# Patient Record
Sex: MALE | Race: WHITE | NOT HISPANIC OR LATINO | Employment: UNEMPLOYED | URBAN - METROPOLITAN AREA
[De-identification: names, ages, dates, MRNs, and addresses within clinical notes are randomized per-mention and may not be internally consistent; named-entity substitution may affect disease eponyms.]

---

## 2023-01-02 ENCOUNTER — APPOINTMENT (OUTPATIENT)
Dept: RADIOLOGY | Facility: MEDICAL CENTER | Age: 33
End: 2023-01-02
Attending: EMERGENCY MEDICINE

## 2023-01-02 ENCOUNTER — HOSPITAL ENCOUNTER (EMERGENCY)
Facility: MEDICAL CENTER | Age: 33
End: 2023-01-02
Attending: EMERGENCY MEDICINE

## 2023-01-02 VITALS
SYSTOLIC BLOOD PRESSURE: 136 MMHG | RESPIRATION RATE: 16 BRPM | BODY MASS INDEX: 41.75 KG/M2 | HEART RATE: 67 BPM | HEIGHT: 73 IN | OXYGEN SATURATION: 95 % | TEMPERATURE: 97.2 F | WEIGHT: 315 LBS | DIASTOLIC BLOOD PRESSURE: 65 MMHG

## 2023-01-02 DIAGNOSIS — S43.014A ANTERIOR DISLOCATION OF RIGHT SHOULDER, INITIAL ENCOUNTER: ICD-10-CM

## 2023-01-02 PROCEDURE — 73030 X-RAY EXAM OF SHOULDER: CPT | Mod: RT

## 2023-01-02 PROCEDURE — A9270 NON-COVERED ITEM OR SERVICE: HCPCS | Performed by: EMERGENCY MEDICINE

## 2023-01-02 PROCEDURE — 700111 HCHG RX REV CODE 636 W/ 250 OVERRIDE (IP): Performed by: EMERGENCY MEDICINE

## 2023-01-02 PROCEDURE — 23650 CLTX SHO DSLC W/MNPJ WO ANES: CPT

## 2023-01-02 PROCEDURE — 96374 THER/PROPH/DIAG INJ IV PUSH: CPT

## 2023-01-02 PROCEDURE — 700102 HCHG RX REV CODE 250 W/ 637 OVERRIDE(OP): Performed by: EMERGENCY MEDICINE

## 2023-01-02 PROCEDURE — 99285 EMERGENCY DEPT VISIT HI MDM: CPT

## 2023-01-02 RX ORDER — OXYCODONE HYDROCHLORIDE AND ACETAMINOPHEN 5; 325 MG/1; MG/1
1 TABLET ORAL ONCE
Status: COMPLETED | OUTPATIENT
Start: 2023-01-02 | End: 2023-01-02

## 2023-01-02 RX ORDER — PROPOFOL 10 MG/ML
200 INJECTION, EMULSION INTRAVENOUS ONCE
Status: DISCONTINUED | OUTPATIENT
Start: 2023-01-02 | End: 2023-01-02 | Stop reason: HOSPADM

## 2023-01-02 RX ORDER — PROPOFOL 10 MG/ML
INJECTION, EMULSION INTRAVENOUS
Status: COMPLETED | OUTPATIENT
Start: 2023-01-02 | End: 2023-01-02

## 2023-01-02 RX ADMIN — PROPOFOL 30 MG: 10 INJECTION, EMULSION INTRAVENOUS at 12:38

## 2023-01-02 RX ADMIN — OXYCODONE AND ACETAMINOPHEN 1 TABLET: 5; 325 TABLET ORAL at 11:02

## 2023-01-02 RX ADMIN — PROPOFOL 20 MG: 10 INJECTION, EMULSION INTRAVENOUS at 12:42

## 2023-01-02 RX ADMIN — PROPOFOL 30 MG: 10 INJECTION, EMULSION INTRAVENOUS at 12:40

## 2023-01-02 RX ADMIN — PROPOFOL 30 MG: 10 INJECTION, EMULSION INTRAVENOUS at 12:46

## 2023-01-02 RX ADMIN — PROPOFOL 20 MG: 10 INJECTION, EMULSION INTRAVENOUS at 12:44

## 2023-01-02 RX ADMIN — PROPOFOL 20 MG: 10 INJECTION, EMULSION INTRAVENOUS at 12:43

## 2023-01-02 RX ADMIN — PROPOFOL 20 MG: 10 INJECTION, EMULSION INTRAVENOUS at 12:45

## 2023-01-02 NOTE — ED PROVIDER NOTES
ED Physician Note        CHIEF COMPLAINT  Chief Complaint   Patient presents with    Shoulder Pain    GLF     Pt slipped on ice this AM and tried catching himself with his R arm, pt now believes his R shoulder is dislocated, no hx of dislocation, shoulder does appear to possibly be dislocated in triage, arm is in sling, +CMS in distal RUE           HPI    Adam Power is a 32 y.o. male who presents to the emergency department today for evaluation of a right shoulder injury that occurred immediately prior to arrival.  He was walking when he slipped on ice and fell striking his right shoulder.  He now has pain localized to the right shoulder, he states he is having difficulty ranging the shoulder.  He does not have any significant pain localized to the elbow or wrist.  He also struck his left upper extremity but states he did not have any significant pain, the left upper extremity feels fine.  He did not strike his head, he reports no headache, no nausea, no vomiting.  He reports no other injuries, he was easily able to ambulate in, reports no injuries to the lower extremities.  He reports no significant past medical history, he is not currently on any anticoagulation or antiplatelet agents.    REVIEW OF SYSTEMS  See HPI for further details.     PAST MEDICAL HISTORY       SURGICAL HISTORY  patient denies any surgical history    FAMILY HISTORY  No family history on file.    SOCIAL HISTORY  Social History     Tobacco Use    Smoking status: Not on file    Smokeless tobacco: Not on file   Substance and Sexual Activity    Alcohol use: Not on file    Drug use: Not on file    Sexual activity: Not on file       CURRENT MEDICATIONS  Home Medications       Reviewed by Anant Mcmanus R.N. (Registered Nurse) on 01/02/23 at 1028  Med List Status: Not Addressed     Medication Last Dose Status        Patient José Luis Taking any Medications                           ALLERGIES  Not on File    PHYSICAL EXAM  Vital Signs: BP  "136/65   Pulse 67   Temp 36.2 °C (97.2 °F)   Resp 16   Ht 1.854 m (6' 1\")   Wt (!) 145 kg (320 lb 5.3 oz)   SpO2 95%   BMI 42.26 kg/m²   Constitutional: Alert, no acute distress  HENT: Atraumatic  Neck: Normal range of motion  Cardiovascular: Right upper extremity warm, well perfused, 2+ radial pulse  Pulmonary: Normal work of breathing  Skin: Right shoulder with normal appearing overlying skin, no lacerations nor abrasions  Musculoskeletal: Right shoulder with some tenderness to palpation along the anterior aspect, he is unable to fully range the shoulder on my initial assessment, there is no evidence of neurovascular compromise, he does have sensory function in the axillary nerve distribution  Neurologic: As documented in musculoskeletal exam    RADIOLOGY  I have independently interpreted the diagnostic imaging prior to and after the procedure which shows dislocation and subsequent reduction.  DX-SHOULDER 2+ RIGHT   Final Result      1.  Interval reduction of the right anterior inferior glenohumeral joint dislocation with subsequent Hill-Sachs deformity of the humeral head.      DX-SHOULDER 2+ RIGHT   Final Result      1.  Right glenohumeral joint dislocation.            COURSE & MEDICAL DECISION MAKING    Mr. Power presents to the emergency department today for evaluation of a right shoulder injury that occurred immediately prior to arrival.  He has no evidence of neurovascular compromise, but is not able to fully range the shoulder, differential diagnosis includes fracture, dislocation, or soft tissue injury.  Plain films were performed which demonstrate right anterior dislocation.  AC joint is maintained.    Due to shoulder dislocation, care was escalated to include procedural sedation and closed reduction.  Informed consent was obtained, risks and benefits of the procedure were discussed with the patient.  Procedural sedation and closed reduction were performed according to the below procedure notes, " with no complications.  Reduction did not require a significant amount of force.  Repeat imaging demonstrated interval reduction with Hill-Sachs deformity of the humeral head, given ease of reduction, I suspect the Hill-Sachs deformity was present prior to reduction, yet not fully visualized.    Plan at this time is for discharge home.  He will follow-up with orthopedics, and is provided with a referral to [facility], as their physician is on-call today.  He will call tomorrow morning to schedule a follow-up appointment.  Arm is immobilized in a sling. Return precautions were discussed with the patient, and provided in written form with the patient's discharge instructions.     CONSCIOUS SEDATION PROCEDURE NOTE:  Patient identification was confirmed and patient consent was obtained.  A timeout was performed immediately prior to the procedure.  The procedure was performed by Dr. Laguna.  Anesthetic used: Propofol  Length of Time: Please see nursing documentation for detailed timeline of sedation events  Other medications administered: None required  Pre-procedure neuro examination revealed no deficits. Patient's airway remained patent, O2SAT adequate through procedure. Vitals remained stable. Patient tolerated procedure well without complications. Post-procedure exam showed patient w/o cranial deficits. Sensory and motor intact. Patient returned to baseline prior to disposition.  Instructions for care discussed verbally and pt provided with additional written instructions for homecare and f/u.    REDUCTION PROCEDURE NOTE:  Patient identification was confirmed, consent was obtained.  A timeout was performed immediately prior to the procedure.  This procedure was performed by Dr. Laguna  Site right shoulder closed reduction  Anesthetic used (type and amt): Propofol  Pre-procedure N/V exam: No evidence of neurovascular compromise  # of attempts: 1 attempt utilizing traction countertraction technique  Type  of splint: Sling  Pt anesthetized, fracture/dislocation reduced successfully. Patient tolerated procedure well without complications. Post-procedure exam indicates patient is n/v intact distal to the injury site. Post-procedure films show excellent alignment. Patient  returned to baseline prior to disposition. Instructions for care discussed verbally and patient provided with additional written instructions for homecare and f/u.      FINAL IMPRESSION  1. Anterior dislocation of right shoulder, initial encounter        Electronically signed by: Berta Laguna M.D.

## 2023-01-02 NOTE — ED TRIAGE NOTES
"Chief Complaint   Patient presents with    Shoulder Pain    GLF     Pt slipped on ice this AM and tried catching himself with his R arm, pt now believes his R shoulder is dislocated, no hx of dislocation, shoulder does appear to possibly be dislocated in triage, arm is in sling, +CMS in distal RUE     Pt denies head trauma or LOC, VSS on RA, gCS 15, NAD. Pt ambulatory to triage.    Pt returned to lobby. Educated on triage process and to inform staff of any changes.     BP (!) 140/89   Pulse 81   Temp 36.6 °C (97.9 °F) (Temporal)   Resp 17   Ht 1.854 m (6' 1\")   Wt (!) 145 kg (320 lb 5.3 oz)   SpO2 96%   BMI 42.26 kg/m²     "

## 2023-01-02 NOTE — ED NOTES
Pt sedated using moderate sedation, shoulder reduction performed. Sling placed on Pt. Pt is awake and alert, speaking in full sentences

## 2023-01-02 NOTE — DISCHARGE INSTRUCTIONS
Please continue to wear the sling until you follow-up with orthopedic surgery.  Please contact the orthopedic surgery clinic listed above to schedule a follow-up appointment for complete recheck, and further treatment of your shoulder dislocation.  Return to the emergency department if you develop any new or worsening symptoms including worsening pain, abnormal appearance, or if you have any further concerns.  Your x-ray does show a fracture associated with the dislocation, this is also treated with the sling for immobilization.  This will also be reevaluated by the orthopedic surgeon.

## 2023-01-09 ENCOUNTER — APPOINTMENT (OUTPATIENT)
Dept: RADIOLOGY | Facility: MEDICAL CENTER | Age: 33
End: 2023-01-09
Attending: EMERGENCY MEDICINE

## 2023-01-09 ENCOUNTER — HOSPITAL ENCOUNTER (EMERGENCY)
Facility: MEDICAL CENTER | Age: 33
End: 2023-01-09
Attending: EMERGENCY MEDICINE

## 2023-01-09 VITALS
RESPIRATION RATE: 18 BRPM | OXYGEN SATURATION: 97 % | WEIGHT: 315 LBS | BODY MASS INDEX: 42.73 KG/M2 | HEART RATE: 86 BPM | TEMPERATURE: 98 F | SYSTOLIC BLOOD PRESSURE: 143 MMHG | DIASTOLIC BLOOD PRESSURE: 87 MMHG

## 2023-01-09 DIAGNOSIS — S43.014A ANTERIOR SHOULDER DISLOCATION, RIGHT, INITIAL ENCOUNTER: ICD-10-CM

## 2023-01-09 DIAGNOSIS — M25.511 ACUTE PAIN OF RIGHT SHOULDER: ICD-10-CM

## 2023-01-09 PROCEDURE — 99283 EMERGENCY DEPT VISIT LOW MDM: CPT

## 2023-01-09 PROCEDURE — 23650 CLTX SHO DSLC W/MNPJ WO ANES: CPT

## 2023-01-09 PROCEDURE — 73030 X-RAY EXAM OF SHOULDER: CPT | Mod: RT

## 2023-01-09 ASSESSMENT — LIFESTYLE VARIABLES: DO YOU DRINK ALCOHOL: NO

## 2023-01-09 NOTE — ED NOTES
Patient discharged in stable condition per orders. Wristband removed per protocol. Patient verbalized understanding of all discharge instructions. All belongings accounted for. Ambulatory for discharge with steady gait with R shoulder immobilizer in place.

## 2023-01-09 NOTE — ED PROVIDER NOTES
ER Provider Note    Scribed for Paco Healy M.d. by Jimmy Tello. 1/9/2023  11:31 AM    Primary Care Provider: No primary care provider on file.    CHIEF COMPLAINT  Chief Complaint   Patient presents with    Shoulder Pain     Pt states he had a dislocation last week and has been wearing a shoulder immobilizer but rolled over in bed last night and it dislocated again      Dislocation     EXTERNAL RECORDS REVIEWED  Select: Inpatient Notes Seen here 1/2/23 for shoulder dislocation. Procedural sedation and closed reduction was performed successfully.     HPI/ROS  LIMITATION TO HISTORY   Select: : None  OUTSIDE HISTORIAN(S):  None    Adam Power is a 32 y.o. male who presents to the ED complaining of acute right shoulder pain. Patient states last week he was seen here for a shoulder dislocation, and had his shoulder reduced. He has been wearing a shoulder immobilizer since, but this morning around 9AM he woke to find his shoulder became dislocated in his sleep.     PAST MEDICAL HISTORY  History reviewed. No pertinent past medical history.    SURGICAL HISTORY  History reviewed. No pertinent surgical history.    FAMILY HISTORY  No family history on file.    SOCIAL HISTORY       CURRENT MEDICATIONS  Previous Medications    No medications on file       ALLERGIES  Patient has no known allergies.    PHYSICAL EXAM  BP (!) 137/90   Pulse 86   Temp 35.9 °C (96.7 °F) (Temporal)   Resp 18   Wt (!) 147 kg (323 lb 13.7 oz)   SpO2 97%   BMI 42.73 kg/m²   Nursing note and vitals reviewed.  Constitutional: Well-developed and well-nourished. Moderate distress secondary to pain.   HENT: Head is normocephalic and atraumatic. Oropharynx is clear and moist without exudate or erythema.   Eyes: Pupils are equal, round, and reactive to light. Conjunctiva are normal.   Cardiovascular: Normal rate and regular rhythm. No murmur heard. Normal radial pulses.  Pulmonary/Chest: Breath sounds normal. No wheezes or rales.    Abdominal: Soft and non-tender. No distention    Musculoskeletal: Right shoulder in sling, Right shoulder is squared-off with anterior fullness. Normal sensation over the deltoid. Neurovascular intact distally.   Neurological: Awake, alert and oriented to person, place, and time. No focal deficits noted.  Skin: Skin is warm and dry. No rash.   Psychiatric: Normal mood and affect. Appropriate for clinical situation    DIAGNOSTIC STUDIES    Radiology:   The attending emergency physician has independently interpreted the diagnostic imaging associated with this visit and am waiting the final reading from the radiologist.  My read shows that the patient has no evidence of fracture.  Successful reduction of shoulder dislocation.       COURSE & MEDICAL DECISION MAKING     ED Observation Status? Yes; I am placing the patient in to an observation status due to a diagnostic uncertainty as well as therapeutic intensity. Patient placed in observation status at 11:42 AM, 1/9/2023.     INITIAL ASSESSMENT AND PLAN  Care Narrative: Physical exam consistent with a glenohumeral joint dislocation for which he was seen here previously for. Joint reduction was performed as below. He was previously fitted with a shoulder sling and will be given a full shoulder immobilizer.     Joint Reduction Procedure Note    Indication: Joint dislocation    Consent: The patient provided verbal consent for this procedure.    Procedure: The pre-reduction exam showed distal perfusion & neurologic function to be normal. The patient was placed in the appropriate position. Anesthesia/pain control was not necessary. Reduction of the right shoulder was performed by external rotation. Post reduction films were obtained and revealed satisfactory reduction. A post-reduction exam revealed distal perfusion & neurologic function to be normal. The affected area was immobilized with a shoulder immobilizer.    The patient tolerated the procedure  well.    Complications: None    ADDITIONAL PROBLEM LIST AND DISPOSITION        I have discussed management of the patient with the following physicians and INDY's:  None    Discussion of management with other QHP or appropriate source(s): None     Escalation of care considered, and ultimately not performed: None.    Barriers to care at this time, including but not limited to: None.     Decision tools and prescription drugs considered including, but not limited to: Select: Pain Medications   .    Patient discharged in stable condition.       FINAL DIANGOSIS  1. Acute pain of right shoulder    2. Anterior shoulder dislocation, right, initial encounter           The note accurately reflects work and decisions made by me.  Paco Healy M.D.  1/9/2023  1:22 PM

## 2023-05-23 NOTE — ED TRIAGE NOTES
Last Appt. 5/18/2023   Next Appt.  Visit date not found   RX Pended Pt to triage .  Chief Complaint   Patient presents with    Shoulder Pain     Pt states he had a dislocation last week and has been wearing a shoulder immobilizer but rolled over in bed last night and it dislocated again      Dislocation